# Patient Record
Sex: FEMALE | Race: AMERICAN INDIAN OR ALASKA NATIVE | HISPANIC OR LATINO | Employment: UNEMPLOYED
[De-identification: names, ages, dates, MRNs, and addresses within clinical notes are randomized per-mention and may not be internally consistent; named-entity substitution may affect disease eponyms.]

---

## 2017-07-01 ENCOUNTER — HEALTH MAINTENANCE LETTER (OUTPATIENT)
Age: 27
End: 2017-07-01

## 2023-05-26 ENCOUNTER — HOSPITAL ENCOUNTER (EMERGENCY)
Facility: CLINIC | Age: 33
Discharge: HOME | End: 2023-05-26
Attending: EMERGENCY MEDICINE | Admitting: EMERGENCY MEDICINE
Payer: COMMERCIAL

## 2023-05-26 ENCOUNTER — APPOINTMENT (OUTPATIENT)
Dept: GENERAL RADIOLOGY | Facility: CLINIC | Age: 33
End: 2023-05-26
Attending: EMERGENCY MEDICINE
Payer: COMMERCIAL

## 2023-05-26 VITALS
TEMPERATURE: 98.8 F | RESPIRATION RATE: 15 BRPM | DIASTOLIC BLOOD PRESSURE: 76 MMHG | OXYGEN SATURATION: 100 % | WEIGHT: 156.4 LBS | SYSTOLIC BLOOD PRESSURE: 121 MMHG | HEART RATE: 96 BPM

## 2023-05-26 DIAGNOSIS — M25.531 RIGHT WRIST PAIN: ICD-10-CM

## 2023-05-26 DIAGNOSIS — L03.113 CELLULITIS OF RIGHT WRIST: ICD-10-CM

## 2023-05-26 LAB
ALBUMIN SERPL BCG-MCNC: 3.9 G/DL (ref 3.5–5.2)
ALP SERPL-CCNC: 76 U/L (ref 35–104)
ALT SERPL W P-5'-P-CCNC: 17 U/L (ref 10–35)
ANION GAP SERPL CALCULATED.3IONS-SCNC: 9 MMOL/L (ref 7–15)
AST SERPL W P-5'-P-CCNC: 21 U/L (ref 10–35)
BASOPHILS # BLD AUTO: 0 10E3/UL (ref 0–0.2)
BASOPHILS NFR BLD AUTO: 0 %
BILIRUB SERPL-MCNC: <0.2 MG/DL
BUN SERPL-MCNC: 9.2 MG/DL (ref 6–20)
CALCIUM SERPL-MCNC: 9.3 MG/DL (ref 8.6–10)
CHLORIDE SERPL-SCNC: 104 MMOL/L (ref 98–107)
CREAT SERPL-MCNC: 0.78 MG/DL (ref 0.51–0.95)
CRP SERPL-MCNC: <3 MG/L
DEPRECATED HCO3 PLAS-SCNC: 24 MMOL/L (ref 22–29)
EOSINOPHIL # BLD AUTO: 0 10E3/UL (ref 0–0.7)
EOSINOPHIL NFR BLD AUTO: 0 %
ERYTHROCYTE [DISTWIDTH] IN BLOOD BY AUTOMATED COUNT: 15.6 % (ref 10–15)
ERYTHROCYTE [SEDIMENTATION RATE] IN BLOOD BY WESTERGREN METHOD: 31 MM/HR (ref 0–20)
GFR SERPL CREATININE-BSD FRML MDRD: >90 ML/MIN/1.73M2
GLUCOSE SERPL-MCNC: 82 MG/DL (ref 70–99)
HCG SERPL QL: NEGATIVE
HCT VFR BLD AUTO: 32 % (ref 35–47)
HGB BLD-MCNC: 10 G/DL (ref 11.7–15.7)
IMM GRANULOCYTES # BLD: 0 10E3/UL
IMM GRANULOCYTES NFR BLD: 0 %
LYMPHOCYTES # BLD AUTO: 1.8 10E3/UL (ref 0.8–5.3)
LYMPHOCYTES NFR BLD AUTO: 27 %
MCH RBC QN AUTO: 25.5 PG (ref 26.5–33)
MCHC RBC AUTO-ENTMCNC: 31.3 G/DL (ref 31.5–36.5)
MCV RBC AUTO: 82 FL (ref 78–100)
MONOCYTES # BLD AUTO: 0.4 10E3/UL (ref 0–1.3)
MONOCYTES NFR BLD AUTO: 6 %
NEUTROPHILS # BLD AUTO: 4.5 10E3/UL (ref 1.6–8.3)
NEUTROPHILS NFR BLD AUTO: 67 %
NRBC # BLD AUTO: 0 10E3/UL
NRBC BLD AUTO-RTO: 0 /100
PLATELET # BLD AUTO: ABNORMAL 10*3/UL
POTASSIUM SERPL-SCNC: 4.3 MMOL/L (ref 3.4–5.3)
PROT SERPL-MCNC: 7.3 G/DL (ref 6.4–8.3)
RBC # BLD AUTO: 3.92 10E6/UL (ref 3.8–5.2)
SODIUM SERPL-SCNC: 137 MMOL/L (ref 136–145)
WBC # BLD AUTO: 6.8 10E3/UL (ref 4–11)

## 2023-05-26 PROCEDURE — 99284 EMERGENCY DEPT VISIT MOD MDM: CPT | Performed by: EMERGENCY MEDICINE

## 2023-05-26 PROCEDURE — 80053 COMPREHEN METABOLIC PANEL: CPT | Performed by: EMERGENCY MEDICINE

## 2023-05-26 PROCEDURE — 73110 X-RAY EXAM OF WRIST: CPT | Mod: RT

## 2023-05-26 PROCEDURE — 36415 COLL VENOUS BLD VENIPUNCTURE: CPT | Performed by: EMERGENCY MEDICINE

## 2023-05-26 PROCEDURE — 86140 C-REACTIVE PROTEIN: CPT | Performed by: EMERGENCY MEDICINE

## 2023-05-26 PROCEDURE — 86593 SYPHILIS TEST NON-TREP QUANT: CPT | Performed by: EMERGENCY MEDICINE

## 2023-05-26 PROCEDURE — 86592 SYPHILIS TEST NON-TREP QUAL: CPT | Performed by: EMERGENCY MEDICINE

## 2023-05-26 PROCEDURE — 85652 RBC SED RATE AUTOMATED: CPT | Performed by: EMERGENCY MEDICINE

## 2023-05-26 PROCEDURE — 86780 TREPONEMA PALLIDUM: CPT | Performed by: EMERGENCY MEDICINE

## 2023-05-26 PROCEDURE — 85041 AUTOMATED RBC COUNT: CPT | Performed by: EMERGENCY MEDICINE

## 2023-05-26 PROCEDURE — 84703 CHORIONIC GONADOTROPIN ASSAY: CPT | Performed by: EMERGENCY MEDICINE

## 2023-05-26 RX ORDER — CEPHALEXIN 500 MG/1
500 CAPSULE ORAL 4 TIMES DAILY
Qty: 56 CAPSULE | Refills: 0 | Status: SHIPPED | OUTPATIENT
Start: 2023-05-26

## 2023-05-26 RX ORDER — DOXYCYCLINE 100 MG/1
100 CAPSULE ORAL 2 TIMES DAILY
Qty: 28 CAPSULE | Refills: 0 | Status: SHIPPED | OUTPATIENT
Start: 2023-05-26 | End: 2023-05-26

## 2023-05-26 RX ORDER — DOXYCYCLINE 100 MG/1
100 CAPSULE ORAL 2 TIMES DAILY
Qty: 28 CAPSULE | Refills: 0 | Status: SHIPPED | OUTPATIENT
Start: 2023-05-26

## 2023-05-26 RX ORDER — CEPHALEXIN 500 MG/1
500 CAPSULE ORAL 4 TIMES DAILY
Qty: 56 CAPSULE | Refills: 0 | Status: SHIPPED | OUTPATIENT
Start: 2023-05-26 | End: 2023-05-26

## 2023-05-26 RX ORDER — METRONIDAZOLE 500 MG/1
500 TABLET ORAL 2 TIMES DAILY
COMMUNITY
Start: 2023-05-26 | End: 2023-06-01

## 2023-05-26 ASSESSMENT — ACTIVITIES OF DAILY LIVING (ADL)
ADLS_ACUITY_SCORE: 33
ADLS_ACUITY_SCORE: 35

## 2023-05-26 NOTE — ED PROVIDER NOTES
History     Chief Complaint   Patient presents with     Wrist Pain     Right wrist swollen, red and painful. Bilateral ankle still red and swollen even after antibiotic treatment for cellulitis.      HPI  Lalitha Middleton is a 33 year old female with a past medical history of IV drug use, positive syphilis testing (4/13/2023), who presents to the emergency department with a chief complaint of wrist pain.  Her right wrist is swollen, red, and painful.  The patient has also been undergoing treatment with antibiotics for presumed cellulitis of her bilateral ankles.  She was seen for this on 5/10/2023 and an x-ray was done which was negative. She was prescribed a 5 day course of doxycycline and keflex, but she reports that the pharmacy only dispensed the doxycycline to her, she is unsure why. She states she was told to just finish the doxycyline course instead.    She is currently on Flagyl as well for positive trichomonas testing. She started this today.    Right wrist swollen, red and painful. Bilateral ankles are somewhat improved but are still red and swollen even after her antibiotic treatment for cellulitis.     Patient had negative gonorrhea testing on 4/13/2023    She was treated with a penicillin injection after her positive RPR    I have reviewed the Medications, Allergies, Past Medical and Surgical History, and Social History in the Epic system.    X-ray right ankle 5/10/2023  Impression: No substantial effusion. Soft tissue swelling.        Past Medical History:   Diagnosis Date     Cellulitis      Past Surgical History:   Procedure Laterality Date     APPENDECTOMY       No current facility-administered medications for this encounter.     Current Outpatient Medications   Medication     metroNIDAZOLE (FLAGYL) 500 MG tablet     No Known Allergies  Past medical history, past surgical history, medications, and allergies were reviewed with the patient. Additional pertinent items: None    Social History      Socioeconomic History     Marital status: Single     Spouse name: Not on file     Number of children: Not on file     Years of education: Not on file     Highest education level: Not on file   Occupational History     Not on file   Tobacco Use     Smoking status: Every Day     Packs/day: 0.25     Types: Cigarettes, Vaping Device     Smokeless tobacco: Never   Vaping Use     Vaping status: Not on file   Substance and Sexual Activity     Alcohol use: Not Currently     Drug use: Yes     Types: Methamphetamines, Opiates     Sexual activity: Not on file   Other Topics Concern     Not on file   Social History Narrative     Not on file     Social Determinants of Health     Financial Resource Strain: Not on file   Food Insecurity: Not on file   Transportation Needs: Not on file   Physical Activity: Not on file   Stress: Not on file   Social Connections: Not on file   Intimate Partner Violence: Not on file   Housing Stability: Not on file     Social history was reviewed with the patient. Additional pertinent items: None    Review of Systems  A medically appropriate review of systems was performed with pertinent positives and negatives noted in the HPI, and all other systems negative.    Physical Exam   BP: 121/76  Pulse: 96  Temp: 98.8  F (37.1  C)  Resp: 15  Weight: 70.9 kg (156 lb 6.4 oz)  SpO2: 100 %      General: Well nourished, well developed, NAD  HEENT: EOMI, anicteric. NCAT, MMM  Neck: no jugular venous distension, supple, nl ROM  Cardiac: Regular rate and rhythm. Intact peripheral pulses  Pulm: Nonlabored breathing, normal respiratory rate  Skin: Warm and dry to the touch.  Erythema overlying radial aspect of right wrist with tenderness and warmth, normal range of motion, distally neurovascularly intact.  Extremities: Very slight bilateral LE edema without significant erythema, induration, warmth, no cyanosis, w/w/p  Neuro: A&Ox3, no gross focal deficits    ED Course        Procedures                            Labs Ordered and Resulted from Time of ED Arrival to Time of ED Departure   ERYTHROCYTE SEDIMENTATION RATE AUTO - Abnormal       Result Value    Erythrocyte Sedimentation Rate 31 (*)    COMPREHENSIVE METABOLIC PANEL - Normal    Sodium 137      Potassium 4.3      Chloride 104      Carbon Dioxide (CO2) 24      Anion Gap 9      Urea Nitrogen 9.2      Creatinine 0.78      Calcium 9.3      Glucose 82      Alkaline Phosphatase 76      AST 21      ALT 17      Protein Total 7.3      Albumin 3.9      Bilirubin Total <0.2      GFR Estimate >90     CRP INFLAMMATION - Normal    CRP Inflammation <3.00     HCG QUALITATIVE PREGNANCY - Normal    hCG Serum Qualitative Negative     TREPONEMA ABS W REFLEX TO RPR AND TITER            Results for orders placed or performed during the hospital encounter of 05/26/23 (from the past 24 hour(s))   CBC with platelets differential    Narrative    The following orders were created for panel order CBC with platelets differential.  Procedure                               Abnormality         Status                     ---------                               -----------         ------                     CBC with platelets and d...[[id number]]  Abnormal            Final result                 Please view results for these tests on the individual orders.   Comprehensive metabolic panel   Result Value Ref Range    Sodium 137 136 - 145 mmol/L    Potassium 4.3 3.4 - 5.3 mmol/L    Chloride 104 98 - 107 mmol/L    Carbon Dioxide (CO2) 24 22 - 29 mmol/L    Anion Gap 9 7 - 15 mmol/L    Urea Nitrogen 9.2 6.0 - 20.0 mg/dL    Creatinine 0.78 0.51 - 0.95 mg/dL    Calcium 9.3 8.6 - 10.0 mg/dL    Glucose 82 70 - 99 mg/dL    Alkaline Phosphatase 76 35 - 104 U/L    AST 21 10 - 35 U/L    ALT 17 10 - 35 U/L    Protein Total 7.3 6.4 - 8.3 g/dL    Albumin 3.9 3.5 - 5.2 g/dL    Bilirubin Total <0.2 <=1.2 mg/dL    GFR Estimate >90 >60 mL/min/1.73m2   CRP inflammation   Result Value Ref Range    CRP Inflammation <3.00  <5.00 mg/L   Erythrocyte sedimentation rate auto   Result Value Ref Range    Erythrocyte Sedimentation Rate 31 (H) 0 - 20 mm/hr   HCG qualitative Blood   Result Value Ref Range    hCG Serum Qualitative Negative Negative   CBC with platelets and differential   Result Value Ref Range    WBC Count 6.8 4.0 - 11.0 10e3/uL    RBC Count 3.92 3.80 - 5.20 10e6/uL    Hemoglobin 10.0 (L) 11.7 - 15.7 g/dL    Hematocrit 32.0 (L) 35.0 - 47.0 %    MCV 82 78 - 100 fL    MCH 25.5 (L) 26.5 - 33.0 pg    MCHC 31.3 (L) 31.5 - 36.5 g/dL    RDW 15.6 (H) 10.0 - 15.0 %    Platelet Count      % Neutrophils 67 %    % Lymphocytes 27 %    % Monocytes 6 %    % Eosinophils 0 %    % Basophils 0 %    % Immature Granulocytes 0 %    NRBCs per 100 WBC 0 <1 /100    Absolute Neutrophils 4.5 1.6 - 8.3 10e3/uL    Absolute Lymphocytes 1.8 0.8 - 5.3 10e3/uL    Absolute Monocytes 0.4 0.0 - 1.3 10e3/uL    Absolute Eosinophils 0.0 0.0 - 0.7 10e3/uL    Absolute Basophils 0.0 0.0 - 0.2 10e3/uL    Absolute Immature Granulocytes 0.0 <=0.4 10e3/uL    Absolute NRBCs 0.0 10e3/uL   XR Wrist Right G/E 3 Views    Narrative    EXAM: XR WRIST RIGHT G/E 3 VIEWS  LOCATION: [facility]  DATE/TIME: 5/26/2023 6:02 PM CDT    INDICATION: pain, swelling  COMPARISON: None.      Impression    IMPRESSION: Normal joint spaces and alignment. No fracture.       Labs, vital signs, and imaging studies were reviewed by me.    Medications - No data to display    Assessments & Plan (with Medical Decision Making)   Lalitha Middleton is a 33 year old female who presents with right wrist pain.  She is also concerned about ongoing bilateral lower extremity swelling.  Differential diagnosis would include right wrist cellulitis, tenosynovitis, polyarthropathy secondary to syphilis infection.  Patient was treated after her positive RPR.  Recent gonorrhea testing was negative.  Septic arthritis is unlikely given normal range of motion of affected  joints.  Labs were ordered to further evaluate the patient.    Patient was discussed with infectious disease, they recommend repeat RPR testing to see if this is decreasing appropriately after her recent treatment with penicillin.  They recommend a 2-week course of antibiotics for cellulitis versus tenosynovitis, Keflex and doxycycline would be appropriate for this.    Laboratory work-up is remarkable for normal white blood cell count at 6.8.  Hemoglobin is low at 10.0, this is stable compared to previous value of 9.5 on patient's labs from 2 weeks ago.  ESR slightly elevated at 31.  CRP is normal.  Pregnancy testing is negative.    Critical care was not performed.     Medical Decision Making  The patient's presentation was of moderate complexity (an undiagnosed new problem with uncertain diagnosis).    The patient's evaluation involved:  review of external note(s) from 1 sources (Previous clinic visit for cellulitis)  ordering and/or review of 3+ test(s) in this encounter (see separate area of note for details)  review of 3+ test result(s) ordered prior to this encounter (see separate area of note for details)  independent interpretation of testing performed by another health professional (Patient's x-ray images personally reviewed by me, agree with radiology read)  discussion of management or test interpretation with another health professional (Infectious disease)    The patient's management necessitated moderate risk (prescription drug management including medications given in the ED).      I have reviewed the nursing notes.    I have reviewed the findings, diagnosis, plan and need for follow up with the patient.    Patient to be discharged home. Advised to follow up with PCP within 1 week. To return to ER immediately with any new/worsening symptoms. Plan of care discussed with patient who expresses understanding and agrees with plan of care.    Discharge Medication List as of 5/26/2023  8:25 PM      START  taking these medications    Details   cephALEXin (KEFLEX) 500 MG capsule Take 1 capsule (500 mg) by mouth 4 times daily for 14 days, Disp-56 capsule, R-0, E-Prescribe      doxycycline hyclate (VIBRAMYCIN) 100 MG capsule Take 1 capsule (100 mg) by mouth 2 times daily for 14 days, Disp-28 capsule, R-0, E-Prescribe             Final diagnoses:   Right wrist pain   Cellulitis of right wrist       LEENA LANE MD  5/26/2023   [facility] EMERGENCY DEPARTMENT     Leena Lane MD  05/26/23 3613

## 2023-05-27 LAB — T PALLIDUM AB SER QL: REACTIVE

## 2023-05-27 NOTE — DISCHARGE INSTRUCTIONS
TODAY'S VISIT:  You were seen today for wrist pain   -   - If you had any labs or imaging/radiology tests performed today, you should also discuss these tests with your usual provider.     FOLLOW-UP:  Please make an appointment to follow up with:  - Your Primary Care Provider. If you do not have a PCP, please call the Primary Care Center (phone: [phone] for an appointment    - Have your provider review the results from today's visit with you again to make sure no further follow-up or additional testing is needed based on those results.     PRESCRIPTIONS / MEDICATIONS:  - keflex, doxycycline for 2 weeks    RETURN TO THE EMERGENCY DEPARTMENT  Return to the Emergency Department at any time for any new or worsening symptoms or any concerns.

## 2023-05-30 ENCOUNTER — TELEPHONE (OUTPATIENT)
Dept: EMERGENCY MEDICINE | Facility: CLINIC | Age: 33
End: 2023-05-30
Payer: COMMERCIAL

## 2023-05-30 LAB
RPR SER QL: REACTIVE
RPR SER-TITR: ABNORMAL {TITER}

## 2023-05-30 NOTE — TELEPHONE ENCOUNTER
[facility] Emergency Department/Urgent Care Lab result notification:    Reason for call    Notify the patient/parent of lab results    Assess patient symptoms (if applicable)    Review ED providers recommendations/discharge instructions (if necessary)    Advise per [facility] ED lab result protocol    Lab result  Rapid Plasma Reagin Titer is 1:16  Recommendations in treatment per [facility] Syphilis Protocol.  RN to contact [facility] regarding result and patient to be notified of recommendations      2:40p Spoke with Tobi Navas [facility] Syphilis coordinator, he states this patient is currently under investigation with [facility] Disease investigator. He states that Lalitha is adequately treated at this time. Repeat testing in October of 2023 for RPR titer is needed.   2:47p  Left voicemail message requesting a call back to [phone] between 9 a.m. and 5:30 p.m. for patient's ED/UC lab results.      Merna Fierro, CONSTANTINE  [facility] Result CONSTANTINE  [facility] Emergency Dept Lab Result RN  # [phone]

## 2023-05-30 NOTE — LETTER
June 1, 2023        Lalitha Middleton  [address]  [place] [address]          Dear Lalitha Middleton:    You were seen in the [facility] Emergency Department at [facility] EMERGENCY DEPARTMENT on 5/30/2023.  We are unable to reach you by phone, so we are sending you this letter.     It is important that you call [facility] Emergency Department lab result nurse at [phone], as we have information to relay to you AND/OR we MAY have to make some changes in your treatment.    Best time to call back is between 9AM and 5:30PM, 7 days a week.      Sincerely,     [facility] Emergency Department Lab Result RN  [phone]

## 2023-06-01 NOTE — TELEPHONE ENCOUNTER
[facility] Emergency Department Lab result notification:    Reason for Letter being mailed out:      Patient treated in the Emergency Dept appropriate but they have NOT be notified about the Positive lab results.  Lab information to be reviewed with Patient or Parent    Unable to reach via telephone so letter sent with a message requesting a call back to [phone] between 9 a.m. and 5:30 p.m., 7 days a week for patient's ED/UC lab results.   Lab result:  Rapid Plasma Reagin Titer is 1:16  Recommendations in treatment per [facility] Syphilis Protocol.  RN to contact [facility] regarding result and patient to be notified of recommendations       2:40p Spoke with Tobi Navas [facility] Syphilis coordinator, he states this patient is currently under investigation with [facility] Disease investigator. He states that Lalitha is adequately treated at this time. Repeat testing in October of 2023 for RPR titer is needed..     Miscellaneous information: EDMOND Lindsay RN  [facility] [place]  Emergency Dept Lab Result RN  Ph# [phone]

## 2025-06-08 ENCOUNTER — HOSPITAL ENCOUNTER (EMERGENCY)
Facility: CLINIC | Age: 35
Discharge: HOME | End: 2025-06-08
Attending: EMERGENCY MEDICINE | Admitting: EMERGENCY MEDICINE
Payer: COMMERCIAL

## 2025-06-08 VITALS
RESPIRATION RATE: 16 BRPM | DIASTOLIC BLOOD PRESSURE: 72 MMHG | SYSTOLIC BLOOD PRESSURE: 111 MMHG | HEIGHT: 65 IN | TEMPERATURE: 98.4 F | OXYGEN SATURATION: 98 % | BODY MASS INDEX: 26.66 KG/M2 | HEART RATE: 82 BPM | WEIGHT: 160 LBS

## 2025-06-08 DIAGNOSIS — L03.116 CELLULITIS OF LEFT LOWER EXTREMITY: ICD-10-CM

## 2025-06-08 DIAGNOSIS — L03.115 CELLULITIS OF RIGHT LOWER EXTREMITY: ICD-10-CM

## 2025-06-08 LAB — CRYSTALS SNV MICRO: NORMAL

## 2025-06-08 PROCEDURE — 76882 US LMTD JT/FCL EVL NVASC XTR: CPT

## 2025-06-08 PROCEDURE — 20605 DRAIN/INJ JOINT/BURSA W/O US: CPT | Mod: LT

## 2025-06-08 PROCEDURE — 250N000013 HC RX MED GY IP 250 OP 250 PS 637: Performed by: EMERGENCY MEDICINE

## 2025-06-08 PROCEDURE — 89060 EXAM SYNOVIAL FLUID CRYSTALS: CPT | Performed by: EMERGENCY MEDICINE

## 2025-06-08 PROCEDURE — 89050 BODY FLUID CELL COUNT: CPT | Performed by: EMERGENCY MEDICINE

## 2025-06-08 PROCEDURE — 99285 EMERGENCY DEPT VISIT HI MDM: CPT | Mod: 25

## 2025-06-08 PROCEDURE — 87070 CULTURE OTHR SPECIMN AEROBIC: CPT | Performed by: EMERGENCY MEDICINE

## 2025-06-08 RX ORDER — IBUPROFEN 600 MG/1
600 TABLET, FILM COATED ORAL ONCE
Status: COMPLETED | OUTPATIENT
Start: 2025-06-08 | End: 2025-06-08

## 2025-06-08 RX ORDER — CEPHALEXIN 500 MG/1
500 CAPSULE ORAL 4 TIMES DAILY
Qty: 40 CAPSULE | Refills: 0 | Status: SHIPPED | OUTPATIENT
Start: 2025-06-08 | End: 2025-06-18

## 2025-06-08 RX ORDER — DOXYCYCLINE 100 MG/1
100 CAPSULE ORAL 2 TIMES DAILY
Qty: 20 CAPSULE | Refills: 0 | Status: SHIPPED | OUTPATIENT
Start: 2025-06-08 | End: 2025-06-18

## 2025-06-08 RX ADMIN — IBUPROFEN 600 MG: 600 TABLET ORAL at 03:58

## 2025-06-08 ASSESSMENT — COLUMBIA-SUICIDE SEVERITY RATING SCALE - C-SSRS
1. IN THE PAST MONTH, HAVE YOU WISHED YOU WERE DEAD OR WISHED YOU COULD GO TO SLEEP AND NOT WAKE UP?: NO
2. HAVE YOU ACTUALLY HAD ANY THOUGHTS OF KILLING YOURSELF IN THE PAST MONTH?: NO
6. HAVE YOU EVER DONE ANYTHING, STARTED TO DO ANYTHING, OR PREPARED TO DO ANYTHING TO END YOUR LIFE?: NO

## 2025-06-08 ASSESSMENT — ACTIVITIES OF DAILY LIVING (ADL)
ADLS_ACUITY_SCORE: 41

## 2025-06-08 NOTE — ED TRIAGE NOTES
Patient here  with bilateral ankle pain, redness and swelling. She stated her symptoms started yesterday     Triage Assessment (Adult)       Row Name 06/08/25 0213          Triage Assessment    Airway WDL WDL        Respiratory WDL    Respiratory WDL WDL        Skin Circulation/Temperature WDL    Skin Circulation/Temperature WDL WDL        Cardiac WDL    Cardiac WDL WDL        Peripheral/Neurovascular WDL    Peripheral Neurovascular WDL WDL        Cognitive/Neuro/Behavioral WDL    Cognitive/Neuro/Behavioral WDL WDL

## 2025-06-08 NOTE — DISCHARGE INSTRUCTIONS
Please continue with both Tylenol and ibuprofen together every 6 hours as needed for pain and swelling.  Please return to the ER for worsening symptoms or further concerns.

## 2025-06-08 NOTE — ED PROVIDER NOTES
"  Emergency Department Note      History of Present Illness   Chief Complaint   Ankle Pain      HPI   Lalitha Middleton is a 35 year old female with a history of opioid use disorder who presents to the ED for evaluation of ankle pain.  She reports that beginning yesterday she developed pain, swelling, and redness to the bilateral ankles that progressed.  She states that it is painful to walk on them.  She has not been taking anything for them.  She reports that she has a history of this and this reoccurs every few years have been diagnosed with cellulitis in the past.  She denies any fevers, body aches, nausea vomiting, or other symptoms.  She denies any trauma to the area.    Independent Historian   Significant other assists with history    Review of External Notes   I reviewed the [facility] note 5/10/23 discussing cellulitis  Past Medical History   Medical History and Problem List   Cellulitis   Anxiety  Alcohol dependence  Eating disorder   Methamphetamine use disorder   Opioid use disorder  PTSD  Major depressive disorder  Suicidal ideation  UTI  TAJ  Overdose     Medications   Keflex   Vibramycin     Surgical History   Appendectomy   D & C  Physical Exam     Patient Vitals for the past 24 hrs:   BP Temp Temp src Pulse Resp SpO2 Height Weight   06/08/25 0629 111/72 -- -- 82 16 98 % -- --   06/08/25 0325 -- -- -- 98 -- 100 % -- --   06/08/25 0215 132/80 98.4  F (36.9  C) Temporal 119 18 100 % 1.651 m (5' 5\") 72.6 kg (160 lb)     Physical Exam  General:  Alert, nontoxic in appearance  CV:  Appears well perfused  Lungs:  No obvious respiratory distress  Neuro:  Speaking clearly, no slurred speech  MSK: Swelling to the bilateral ankles with some erythema and warmth to the medial and lateral aspects of the ankle, but not circumferential.  No bony deformities.  No skin findings to the remainder of the legs. Distal pulses intact.  Normal strength, cap refill, and sensation distally.     Diagnostics   Lab Results "   Labs Ordered and Resulted from Time of ED Arrival to Time of ED Departure   CRYSTAL ID SYNOVIAL FLUID - Normal       Result Value    Crystals Analysis No clinically significant crystals seen.     AEROBIC BACTERIAL CULTURE ROUTINE     Imaging   POC US SOFT TISSUE   Final Result   [facility] Procedure Note       Limited Bedside ED Ultrasound of ankle joints      PROCEDURE: PERFORMED BY: Dr. Darius Davis MD   INDICATIONS/SYMPTOM: Skin redness and swelling   PROBE: High frequency linear probe   BODY LOCATION: Bilateral ankles       FINDINGS: Minimal normal joint fluid to bilateral ankles      INTERPRETATION:  Minimal normal joint fluid to bilateral ankles      IMAGE DOCUMENTATION: Images were archived to PACs system.                ED Course    Medications Administered   Medications   ibuprofen (ADVIL/MOTRIN) tablet 600 mg (600 mg Oral $Given 6/8/25 0358)     Procedures   Procedures      Arthrocentesis     Procedure: Arthrocentesis    Indication: Joint Pain    Consent: Written from Patient    Universal Protocol: Universal protocol was followed and time out conducted just prior to starting procedure, confirming patient identity, site/side, procedure, patient position, and availability of correct equipment and implants.     Location: Left Ankle    Preparation: Chlorhexidine    Anesthesia/Sedation: Lidocaine - 1%    Procedure Detail: The site was prepped and draped in the usual fashion.  A 19 gauge needle was used via the anterior approach.  1mL appearing joint fluid was withdrawn. The fluid was clear.      Patient Status: The patient tolerated the procedure well: Yes. There were no complications.    Discussion of Management   None    ED Course   ED Course as of 06/08/25 0900   Sun Jun 08, 2025   0335 I obtained history and examined the patient as noted above.    0345 I performed a bedside ultrasound.    0358 I rechecked the patient.    0456 I rechecked the patient and went over the consent form. I  performed a arthrocentesis as noted above.    0616 I rechecked the patient and explained findings.    0622 I prepared the patient to be discharged home.      Additional Documentation  None  Medical Decision Making / Diagnosis   CMS Diagnoses: None    MIPS       None    [facility]   Lalitha Middleton is a 35 year old female presents with swelling to the bilateral ankles with some erythema to the ankles.  States the symptoms started earlier in the day and progressed.  She states that this happens to her every couple of years has been diagnosed with cellulitis.  She denies any systemic symptoms such as fevers, body aches, or abdominal symptoms.  She stated current presentation is the same as her prior presentations and I was able to review prior evaluations in the chart.  During physical exam the patient did have swelling to the bilateral ankles with some erythema to the medial and lateral aspects that was not circumferential.  There is no signs of bony injury.  The appearance of the ankles was not consistent with a septic joint.  While the patient has been diagnosed with cellulitis in the past with this, I question this diagnosis given the bilateral nature and given she has had other joint involvement in the past.  The presentation is more consistent with an inflammatory type process.  After discussion of the risks and benefits, I did proceed with an arthrocentesis.  I did do a bedside ultrasound which showed only minimal joint fluid bilaterally, which seem to be a normal physiologic amount.  I was able to obtain a small amount of synovial fluid on the left ankle.  Unfortunately only a crystal and culture was able to be completed.  There were no crystals noted for gout or pseudogout, although I do feel this is a potential etiology given the minimal fluid available for evaluation.  Cultures are pending, but again clinically I have a very low suspicion for septic joint given the multijoint involvement, and multiple prior  episodes of the same presentation without a septic joint.  Given the patient reports that she has responded to oral antibiotics in the past, I felt it is reasonable to once again proceed with this treatment, but I did recommend that she follow-up with a primary care doctor to discuss consideration of further evaluation and possibly further specialty consultation such as rheumatology if felt to be indicated.  Patient was given return precautions.    Disposition   The patient was discharged.     Diagnosis     ICD-10-CM    1. Cellulitis of right lower extremity  L03.115 ED To Primary Care Referral      2. Cellulitis of left lower extremity  L03.116 ED To Primary Care Referral         Discharge Medications   Discharge Medication List as of 6/8/2025  6:25 AM        Scribe Disclosure:  I, Inés Starks, am serving as a scribe at 3:40 AM on 6/8/2025 to document services personally performed by Darius Davis MD based on my observations and the provider's statements to me.      Darius Davis MD  06/08/25 0900

## 2025-06-12 LAB
BACTERIA SNV CULT: NORMAL
GRAM STAIN RESULT: NORMAL
GRAM STAIN RESULT: NORMAL